# Patient Record
Sex: FEMALE | NOT HISPANIC OR LATINO | Employment: UNEMPLOYED | URBAN - METROPOLITAN AREA
[De-identification: names, ages, dates, MRNs, and addresses within clinical notes are randomized per-mention and may not be internally consistent; named-entity substitution may affect disease eponyms.]

---

## 2017-01-13 ENCOUNTER — TELEPHONE (OUTPATIENT)
Dept: OBSTETRICS AND GYNECOLOGY | Facility: CLINIC | Age: 42
End: 2017-01-13

## 2017-01-13 NOTE — TELEPHONE ENCOUNTER
----- Message from Samanta Hatfield sent at 1/13/2017 10:35 AM CST -----  Contact: self  Pt needing a call back, pt states she has called before, pt can be reached at [phone].

## 2017-01-13 NOTE — TELEPHONE ENCOUNTER
I spoke to the pt and she states that she has been bleeding. The pt  missed two pills and then she caught up with one and did not take the second one she missed. The pt states that she started back taking her pills daily and now she has been bleeding a lot. Pt was informed to continue her pills and she should stop Bleeding.. Pt was informed to keep her appt on next week to discuss other birth control options.

## 2017-02-15 ENCOUNTER — OFFICE VISIT (OUTPATIENT)
Dept: OBSTETRICS AND GYNECOLOGY | Facility: CLINIC | Age: 42
End: 2017-02-15
Payer: MEDICAID

## 2017-02-15 ENCOUNTER — PATIENT OUTREACH (OUTPATIENT)
Dept: ADMINISTRATIVE | Facility: HOSPITAL | Age: 42
End: 2017-02-15

## 2017-02-15 VITALS
SYSTOLIC BLOOD PRESSURE: 120 MMHG | DIASTOLIC BLOOD PRESSURE: 60 MMHG | HEIGHT: 60 IN | BODY MASS INDEX: 28.39 KG/M2 | WEIGHT: 144.63 LBS

## 2017-02-15 DIAGNOSIS — Z30.41 ENCOUNTER FOR SURVEILLANCE OF CONTRACEPTIVE PILLS: ICD-10-CM

## 2017-02-15 DIAGNOSIS — Z12.31 VISIT FOR SCREENING MAMMOGRAM: ICD-10-CM

## 2017-02-15 DIAGNOSIS — N92.1 BREAKTHROUGH BLEEDING ON OCPS: ICD-10-CM

## 2017-02-15 DIAGNOSIS — Z01.419 VISIT FOR GYNECOLOGIC EXAMINATION: Primary | ICD-10-CM

## 2017-02-15 LAB
B-HCG UR QL: NEGATIVE
CTP QC/QA: YES

## 2017-02-15 PROCEDURE — 87591 N.GONORRHOEAE DNA AMP PROB: CPT

## 2017-02-15 PROCEDURE — 99396 PREV VISIT EST AGE 40-64: CPT | Mod: S$PBB,,, | Performed by: NURSE PRACTITIONER

## 2017-02-15 PROCEDURE — 99999 PR PBB SHADOW E&M-EST. PATIENT-LVL III: CPT | Mod: PBBFAC,,, | Performed by: NURSE PRACTITIONER

## 2017-02-15 PROCEDURE — 81025 URINE PREGNANCY TEST: CPT | Mod: PBBFAC | Performed by: NURSE PRACTITIONER

## 2017-02-15 PROCEDURE — 99213 OFFICE O/P EST LOW 20 MIN: CPT | Mod: PBBFAC | Performed by: NURSE PRACTITIONER

## 2017-02-15 RX ORDER — DROSPIRENONE AND ETHINYL ESTRADIOL 0.02-3(28)
1 KIT ORAL DAILY
Qty: 30 TABLET | Refills: 11 | Status: SHIPPED | OUTPATIENT
Start: 2017-02-15 | End: 2018-01-29 | Stop reason: SDUPTHER

## 2017-02-15 RX ORDER — DEXTROAMPHETAMINE SACCHARATE, AMPHETAMINE ASPARTATE, DEXTROAMPHETAMINE SULFATE AND AMPHETAMINE SULFATE 2.5; 2.5; 2.5; 2.5 MG/1; MG/1; MG/1; MG/1
TABLET ORAL
Refills: 0 | COMMUNITY
Start: 2017-02-11

## 2017-02-15 NOTE — PROGRESS NOTES
CC: Annual  HPI: Pt is a 42 y.o.  female who presents for routine annual exam. She uses OCPs for contraception. Pt has hx of fibroids and endometriosis. Pt reports breakthrough bleeding on OCPs-denies missing any pills in the pack. Pt is requesting a different OCP. States she used to be on one OCP that never gave her problems but it is now discontinued. Pt is debating a tummy tuck and a hysterectomy together if the bleeding continues. Pt has appt with plastic surgeon at [facility]. Reports fibrocystic breasts. Denies family hx of breast cancer.     Last MMG 4/2015  Last pap 1/2015 WNL    ROS:  GENERAL: Feeling well overall. Denies fever or chills.   SKIN: Denies rash or lesions.   HEAD: Denies head injury or headache.   NODES: Denies enlarged lymph nodes.   CHEST: Denies chest pain or shortness of breath.   CARDIOVASCULAR: Denies palpitations or left sided chest pain.   ABDOMEN: No abdominal pain, constipation, diarrhea, nausea, vomiting or rectal bleeding.   URINARY: No dysuria, hematuria, or burning on urination.  REPRODUCTIVE: See HPI.   BREASTS: Denies pain, lumps, or nipple discharge.   HEMATOLOGIC: No easy bruisability or excessive bleeding.   MUSCULOSKELETAL: Denies joint pain or swelling.   NEUROLOGIC: Denies syncope or weakness.   PSYCHIATRIC: Denies depression, anxiety or mood swings.    PE:   APPEARANCE: Well nourished, well developed, Black or  female in no acute distress.  NODES: no cervical, supraclavicular, or inguinal lymphadenopathy  BREASTS: Symmetrical, no skin changes or visible lesions. No palpable masses, nipple discharge or adenopathy bilaterally.  ABDOMEN: Soft. No tenderness or masses. No distention. No hernias palpated. No CVA tenderness.  VULVA: No lesions. Normal external female genitalia.  URETHRAL MEATUS: Normal size and location, no lesions, no prolapse.  URETHRA: No masses, tenderness, or prolapse.  VAGINA: Moist. No lesions or lacerations noted. No abnormal discharge  present. No odor present.   CERVIX: No lesions or discharge. No cervical motion tenderness.   UTERUS: Normal size, regular shape, mobile, non-tender.  ADNEXA: No tenderness. No fullness or masses palpated in the adnexal regions.   ANUS PERINEUM: Normal.      Diagnosis:  1. Visit for gynecologic examination    2. Visit for screening mammogram    3. Breakthrough bleeding on OCPs        Plan:     Orders Placed This Encounter    C. trachomatis/N. gonorrhoeae by AMP DNA Urine    Mammo Digital Screening Bilat with CAD    POCT urine pregnancy     UPT negative  GCCT pending  Rx new OCP-discussed giving it 3-4 months to regulate  Pap current-due in 2018  MMG scheduled  Pt will make appt with Dr. Deleon if she wants to proceed with hyst.     Patient was counseled today on the new ACS guidelines for cervical cytology screening as well as the current recommendations for breast cancer screening. She was counseled to follow up with her PCP for other routine health maintenance. Counseling session lasted approximately 10 minutes, and all her questions were answered.    Follow-up with me in 1 year for routine exam; pap in 1 year.

## 2017-02-15 NOTE — MR AVS SNAPSHOT
[facility] [address]  [address]  [address]  [facility] [address]  Phone: [phone]  Fax: [phone]                  Matilda Melgar   2/15/2017 3:20 PM   Office Visit    Description:  Female : 1975   Provider:  Margarita Gomez NP   Department:  [facility] [address]           Reason for Visit     Well Woman                To Do List           Future Appointments        Provider Department Dept Phone    2/15/2017 3:20 PM Margarita Gomez NP [facility] [address] [phone]      Goals (5 Years of Data)     None      [facility] On Call     [facility] On Call Nurse [facility] Line -  Assistance  Registered nurses in the [facility] On Call Center provide clinical advisement, health education, appointment booking, and other advisory services.  Call for this free service at [phone].             Medications           Message regarding Medications     Verify the changes and/or additions to your medication regime listed below are the same as discussed with your clinician today.  If any of these changes or additions are incorrect, please notify your healthcare provider.        STOP taking these medications     norgestimate-ethinyl estradiol (ORTHO TRI-CYCLEN,TRI-SPRINTEC) 0.18/0.215/0.25 mg-35 mcg (28) tablet Take 1 tablet by mouth once daily.    pantoprazole (PROTONIX) 40 MG tablet     sertraline (ZOLOFT) 50 MG tablet Take 1 tablet (50 mg total) by mouth once daily.           Verify that the below list of medications is an accurate representation of the medications you are currently taking.  If none reported, the list may be blank. If incorrect, please contact your healthcare provider. Carry this list with you in case of emergency.           Current Medications     ranitidine (ZANTAC) 150 MG tablet Take 150 mg by mouth 2 (two) times daily.    alprazolam (XANAX) 0.25 MG tablet     biotin 5 mg Tab Take by mouth.    dextroamphetamine-amphetamine 10 mg Tab TK ONE T PO  QAM AND  1 T PO AT NOON FOR ADHD    ferrous sulfate 324 mg (65 mg iron) TbEC Take 325 mg by mouth once daily.    fish oil-omega-3 fatty acids 300-1,000 mg capsule Take 2 g by mouth once daily.    folic acid (FOLVITE) 400 MCG tablet Take 400 mcg by mouth once daily.    lisinopril-hydrochlorothiazide (PRINZIDE,ZESTORETIC) 20-12.5 mg per tablet     multivitamin capsule Take 1 capsule by mouth once daily.    norethindrone-e.estradiol-iron 1 mg-20 mcg (24)/75 mg (4) Oral per tablet Take 1 tablet by mouth once daily.           Clinical Reference Information           Your Vitals Were     BP Height Weight BMI       120/60 5' (1.524 m) 65.6 kg (144 lb 10 oz) 28.24 kg/m2       Blood Pressure          Most Recent Value    BP  120/60      Allergies as of 2/15/2017     No Known Allergies      Immunizations Administered on Date of Encounter - 2/15/2017     None      Language Assistance Services     ATTENTION: Language assistance services are available, free of charge. Please call [phone].      ATENCIÓN: Si habla español, tiene a ovalle disposición servicios gratuitos de asistencia lingüística. Llame al [phone].     SHON Ý: N?u b?n nói Ti?ng Vi?t, có các d?ch v? h? tr? ngôn ng? mi?n phí dành cho b?n. G?i s? [phone].         [facility] [address] complies with applicable Federal civil rights laws and does not discriminate on the basis of race, color, national origin, age, disability, or sex.

## 2017-02-17 LAB
C TRACH DNA SPEC QL NAA+PROBE: NEGATIVE
N GONORRHOEA DNA SPEC QL NAA+PROBE: NEGATIVE

## 2017-03-01 ENCOUNTER — HOSPITAL ENCOUNTER (OUTPATIENT)
Dept: RADIOLOGY | Facility: OTHER | Age: 42
Discharge: HOME | End: 2017-03-01
Attending: NURSE PRACTITIONER
Payer: MEDICAID

## 2017-03-01 DIAGNOSIS — Z12.31 VISIT FOR SCREENING MAMMOGRAM: ICD-10-CM

## 2017-03-01 PROCEDURE — 77063 BREAST TOMOSYNTHESIS BI: CPT | Mod: 26,,, | Performed by: INTERNAL MEDICINE

## 2017-03-01 PROCEDURE — 77067 SCR MAMMO BI INCL CAD: CPT | Mod: 26,,, | Performed by: INTERNAL MEDICINE

## 2017-03-01 PROCEDURE — 77067 SCR MAMMO BI INCL CAD: CPT | Mod: TC

## 2017-03-15 ENCOUNTER — TELEPHONE (OUTPATIENT)
Dept: OBSTETRICS AND GYNECOLOGY | Facility: CLINIC | Age: 42
End: 2017-03-15

## 2017-03-15 NOTE — TELEPHONE ENCOUNTER
Pt called and stated that after switching her BC within 2 months she is still having bleeding. Pt informed that it can be up to 3-6 months before her cycle can regulate due to her switching pills within 2 months. Pt informed that a message will be sent to Margarita to see if she recommends anything else that she should do.

## 2017-03-15 NOTE — TELEPHONE ENCOUNTER
----- Message from Faiza Regan sent at 3/15/2017  1:26 PM CDT -----  Contact: Self  Pt is calling in regards of her bc MURTAZA. The pt can be reached at [phone]. Thanks KG

## 2017-03-15 NOTE — TELEPHONE ENCOUNTER
Pt informed to continue BC pills and not to skip any and that she can have breakthrough bleeding while on the pills. Informed that after 4 months if this continues to make an appt to discuss other BC options. Pt also stated that at the end of April she will stop the bc pills due to her having cosmetic surgery and surgeon wants her to be off the pills for a month prior to the surgery. Informed message will be sent to Margarita.

## 2017-03-16 NOTE — TELEPHONE ENCOUNTER
LM on VM informing pt that it was okay to stop her OCPs if her surgeon is telling her to do so. If any other questions, left message with CB number.

## 2017-04-03 ENCOUNTER — TELEPHONE (OUTPATIENT)
Dept: OBSTETRICS AND GYNECOLOGY | Facility: CLINIC | Age: 42
End: 2017-04-03

## 2017-04-03 NOTE — TELEPHONE ENCOUNTER
----- Message from Ilan Cunningham sent at 4/3/2017  1:59 PM CDT -----  Contact: Pt  X_ 1st Request  _ 2nd Request  _ 3rd Request    Who:BUSTER GUTIERREZ [[id number]]    Why: Patient states she is returning a call    What Number to Call Back: [phone]    When to Expect a call back: (Before the end of the day)  -- if call after 3:00 call back will be tomorrow.

## 2017-04-03 NOTE — TELEPHONE ENCOUNTER
----- Message from Faiza Regan sent at 4/3/2017  9:03 AM CDT -----  Contact: Self  Pt is calling in regards of a RX that was suppose to be sent to her pharmacy. The pt states that she is not sure what kind. The pt can be reached at [phone]. Thanks KG

## 2017-04-03 NOTE — TELEPHONE ENCOUNTER
Pt is still bleeding since December and she uses 1 pad a day. Pt scheduled her an appt on the my chart and no one was in the office to see her. Pt states that she is on birth control pills but she is still having the bleeding and wanted to know what else she can take to help with the bleeding. The pt states that she lives in [place] and cannot keep driving down for an appt to have nothing done. Pt states that her  is leaving out of town on Friday and she has not been able to be with her  since before December.

## 2017-04-03 NOTE — TELEPHONE ENCOUNTER
----- Message from Barbara Mckee sent at 4/3/2017  1:56 PM CDT -----  Contact: BUSTER GUTIERREZ [[id number]]  _  1st Request  _  2nd Request  _  3rd Request        Who: BUSTER GUTIERREZ [[id number]]    Why: Patient is returning a call    What Number to Call Back: [phone]    When to Expect a call back: (Before the end of the day)   -- if call after 3:00 call back will be tomorrow.

## 2017-04-03 NOTE — TELEPHONE ENCOUNTER
I left a message for the pt to call the office back to inform our office what  Medicine she is calling about that she needs to have called in

## 2017-04-06 ENCOUNTER — OFFICE VISIT (OUTPATIENT)
Dept: OBSTETRICS AND GYNECOLOGY | Facility: CLINIC | Age: 42
End: 2017-04-06
Payer: MEDICAID

## 2017-04-06 VITALS
HEIGHT: 60 IN | SYSTOLIC BLOOD PRESSURE: 110 MMHG | WEIGHT: 140 LBS | BODY MASS INDEX: 27.48 KG/M2 | DIASTOLIC BLOOD PRESSURE: 60 MMHG

## 2017-04-06 DIAGNOSIS — I10 BENIGN ESSENTIAL HTN: ICD-10-CM

## 2017-04-06 DIAGNOSIS — N92.1 MENORRHAGIA WITH IRREGULAR CYCLE: Primary | ICD-10-CM

## 2017-04-06 DIAGNOSIS — D25.9 UTERINE LEIOMYOMA, UNSPECIFIED LOCATION: ICD-10-CM

## 2017-04-06 LAB
B-HCG UR QL: NEGATIVE
CTP QC/QA: YES

## 2017-04-06 PROCEDURE — 99212 OFFICE O/P EST SF 10 MIN: CPT | Mod: S$PBB,,, | Performed by: OBSTETRICS & GYNECOLOGY

## 2017-04-06 PROCEDURE — 81025 URINE PREGNANCY TEST: CPT | Mod: PBBFAC | Performed by: OBSTETRICS & GYNECOLOGY

## 2017-04-06 PROCEDURE — 99999 PR PBB SHADOW E&M-EST. PATIENT-LVL III: CPT | Mod: PBBFAC,,, | Performed by: OBSTETRICS & GYNECOLOGY

## 2017-04-06 PROCEDURE — 99213 OFFICE O/P EST LOW 20 MIN: CPT | Mod: PBBFAC | Performed by: OBSTETRICS & GYNECOLOGY

## 2017-04-06 RX ORDER — ALPRAZOLAM 0.5 MG/1
TABLET ORAL
Refills: 1 | COMMUNITY
Start: 2017-03-10 | End: 2018-08-23

## 2017-04-06 RX ORDER — LISINOPRIL AND HYDROCHLOROTHIAZIDE 10; 12.5 MG/1; MG/1
TABLET ORAL
Refills: 1 | COMMUNITY
Start: 2017-03-12

## 2017-04-06 RX ORDER — PANTOPRAZOLE SODIUM 40 MG/1
TABLET, DELAYED RELEASE ORAL
Refills: 3 | COMMUNITY
Start: 2017-03-18

## 2017-04-06 RX ORDER — SUMATRIPTAN 50 MG/1
TABLET, FILM COATED ORAL
Refills: 2 | COMMUNITY
Start: 2017-02-15

## 2017-04-06 RX ORDER — DIAZEPAM 5 MG/1
TABLET ORAL
Refills: 2 | COMMUNITY
Start: 2017-03-25

## 2017-04-10 NOTE — PROGRESS NOTES
HISTORY OF PRESENT ILLNESS:    Matilda Melgar is a 42 y.o. female  Patient's last menstrual period was 2017. presents today complaining of heavy irregular menses & BTB on new pills.    Past Medical History:   Diagnosis Date    Anxiety 2015    Endometriosis     Fibroid, uterine     History of Helicobacter pylori infection 2015    CALLIE (iron deficiency anemia) 2015       Past Surgical History:   Procedure Laterality Date    ENDOMETRIAL ABLATION      MOUTH SURGERY      PELVIC LAPAROSCOPY      Dr. Loo    RIGHT OOPHORECTOMY      [place]- Dr Loo    TUBAL LIGATION         MEDICATIONS AND ALLERGIES:      Current Outpatient Prescriptions:     alprazolam (XANAX) 0.25 MG tablet, , Disp: , Rfl: 0    biotin 5 mg Tab, Take by mouth., Disp: , Rfl:     dextroamphetamine-amphetamine 10 mg Tab, TK ONE T PO  QAM AND 1 T PO AT NOON FOR ADHD, Disp: , Rfl: 0    diazePAM (VALIUM) 5 MG tablet, TK 1 T PO 30 MINUTES PRIOR TO PROCEDURE FOR ANXIETY. DO NOT TAKE MORE THAN 2 T IN 24 HOURS, Disp: , Rfl: 2    drospirenone-ethinyl estradiol (MURTAZA) 3-0.02 mg per tablet, Take 1 tablet by mouth once daily., Disp: 30 tablet, Rfl: 11    ferrous sulfate 324 mg (65 mg iron) TbEC, Take 325 mg by mouth once daily., Disp: , Rfl:     fish oil-omega-3 fatty acids 300-1,000 mg capsule, Take 2 g by mouth once daily., Disp: , Rfl:     folic acid (FOLVITE) 400 MCG tablet, Take 400 mcg by mouth once daily., Disp: , Rfl:     lisinopril-hydrochlorothiazide (PRINZIDE,ZESTORETIC) 10-12.5 mg per tablet, TK 1 T PO  QAM FOR HIGH BLOOD PRESSURE, Disp: , Rfl: 1    multivitamin capsule, Take 1 capsule by mouth once daily., Disp: , Rfl:     pantoprazole (PROTONIX) 40 MG tablet, TK 1 T PO  QD PRF ACID REFLUX, Disp: , Rfl: 3    ranitidine (ZANTAC) 150 MG tablet, Take 150 mg by mouth 2 (two) times daily., Disp: , Rfl:     sumatriptan (IMITREX) 50 MG tablet, TK 1 T PO AT ONSET OF HEADACHE. MAY TAKE ANOTHER  IN 2 H IF SYMPTOMS PERSIST. DO NOT TK MORE THAN 2 T IN A DAY, Disp: , Rfl: 2    alprazolam (XANAX) 0.5 MG tablet, , Disp: , Rfl: 1    lisinopril-hydrochlorothiazide (PRINZIDE,ZESTORETIC) 20-12.5 mg per tablet, , Disp: , Rfl: 3    Review of patient's allergies indicates:  No Known Allergies    COMPREHENSIVE GYN HISTORY:  PAP History: Denies abnormal Paps.  Infection History: Denies STDs. Denies PID.  Benign History: Denies uterine fibroids. Denies ovarian cysts. Denies endometriosis. Denies other conditions.  Cancer History: Denies cervical cancer. Denies uterine cancer or hyperplasia. Denies ovarian cancer. Denies vulvar cancer or pre-cancer. Denies vaginal cancer or pre-cancer. Denies breast cancer. Denies colon cancer.  Sexual Activity History: Reports currently being sexually active  Menstrual History: Every 28 days, flows for 4 days. Light flow.  Dysmenorrhea History: Denies dysmenorrhea.  Contraception History:      ROS:  GENERAL: No fever or chills.  BREASTS: No pain. No lumps. No discharge.  ABDOMEN: No pain. No nausea. No vomiting. No diarrhea. No constipation.  REPRODUCTIVE: No abnormal bleeding.   VULVA: No pain. No lesions. No itching.  VAGINA: No relaxation. No itching. No odor. No discharge. No lesions.  URINARY: No incontinence. No nocturia. No frequency. No dysuria.    PE:  APPEARANCE: Well nourished, well developed, in no acute distress.  AFFECT: WNL, alert and oriented x 3.  ABDOMEN: Soft. No tenderness or masses. No hepatosplenomegaly. No hernias.  Deferred      1. Menorrhagia with irregular cycle    2. Uterine leiomyoma, unspecified location    3. Benign essential HTN        PLAN:    Orders Placed This Encounter    US Pelvis Comp with Transvag NON-OB (xpd    POCT Urine Pregnancy       COUNSELING:  The patient was counseled today on:    FOLLOW-UP with me in 2-3 weeks

## 2017-10-17 ENCOUNTER — PATIENT OUTREACH (OUTPATIENT)
Dept: INTERNAL MEDICINE | Facility: CLINIC | Age: 42
End: 2017-10-17

## 2017-10-17 NOTE — PROGRESS NOTES
Patient was contacted to scheduled visit with Jonny Tello MD. Attempt unsuccessful,will follow up with patient at a later time.   Voice mail message left.

## 2018-01-29 ENCOUNTER — OFFICE VISIT (OUTPATIENT)
Dept: OBSTETRICS AND GYNECOLOGY | Facility: CLINIC | Age: 43
End: 2018-01-29
Payer: MEDICAID

## 2018-01-29 VITALS
DIASTOLIC BLOOD PRESSURE: 76 MMHG | HEIGHT: 60 IN | SYSTOLIC BLOOD PRESSURE: 124 MMHG | WEIGHT: 141.56 LBS | BODY MASS INDEX: 27.79 KG/M2

## 2018-01-29 DIAGNOSIS — Z12.31 VISIT FOR SCREENING MAMMOGRAM: Primary | ICD-10-CM

## 2018-01-29 DIAGNOSIS — F41.9 ANXIETY: ICD-10-CM

## 2018-01-29 DIAGNOSIS — Z01.419 ENCOUNTER FOR GYNECOLOGICAL EXAMINATION WITHOUT ABNORMAL FINDING: ICD-10-CM

## 2018-01-29 PROCEDURE — 3008F BODY MASS INDEX DOCD: CPT | Mod: ,,, | Performed by: OBSTETRICS & GYNECOLOGY

## 2018-01-29 PROCEDURE — 99999 PR PBB SHADOW E&M-EST. PATIENT-LVL III: CPT | Mod: PBBFAC,,, | Performed by: OBSTETRICS & GYNECOLOGY

## 2018-01-29 PROCEDURE — 99213 OFFICE O/P EST LOW 20 MIN: CPT | Mod: S$PBB,,, | Performed by: OBSTETRICS & GYNECOLOGY

## 2018-01-29 PROCEDURE — 99213 OFFICE O/P EST LOW 20 MIN: CPT | Mod: PBBFAC | Performed by: OBSTETRICS & GYNECOLOGY

## 2018-01-29 RX ORDER — ADAPALENE 0.1 %
GEL (GRAM) TOPICAL
Refills: 1 | COMMUNITY
Start: 2018-01-10

## 2018-02-05 NOTE — PROGRESS NOTES
HISTORY OF PRESENT ILLNESS:    Matilda Melgar is a 43 y.o. female  Patient's last menstrual period was 2017. presents today for follow up of menorrhagia which is resolving.     Past Medical History:   Diagnosis Date    Anxiety 2015    Endometriosis     Fibroid, uterine     History of Helicobacter pylori infection 2015    CALLIE (iron deficiency anemia) 2015       Past Surgical History:   Procedure Laterality Date    ENDOMETRIAL ABLATION      MOUTH SURGERY      PELVIC LAPAROSCOPY      Dr. Loo    RIGHT OOPHORECTOMY      [place]- Dr Loo    TUBAL LIGATION         MEDICATIONS AND ALLERGIES:      Current Outpatient Prescriptions:     alprazolam (XANAX) 0.25 MG tablet, , Disp: , Rfl: 0    alprazolam (XANAX) 0.5 MG tablet, , Disp: , Rfl: 1    biotin 5 mg Tab, Take by mouth., Disp: , Rfl:     dextroamphetamine-amphetamine 10 mg Tab, TK ONE T PO  QAM AND 1 T PO AT NOON FOR ADHD, Disp: , Rfl: 0    diazePAM (VALIUM) 5 MG tablet, TK 1 T PO 30 MINUTES PRIOR TO PROCEDURE FOR ANXIETY. DO NOT TAKE MORE THAN 2 T IN 24 HOURS, Disp: , Rfl: 2    DIFFERIN 0.1 % gel, APPLY THINLY TO FACE QOD, Disp: , Rfl: 1    ferrous sulfate 324 mg (65 mg iron) TbEC, Take 325 mg by mouth once daily., Disp: , Rfl:     fish oil-omega-3 fatty acids 300-1,000 mg capsule, Take 2 g by mouth once daily., Disp: , Rfl:     folic acid (FOLVITE) 400 MCG tablet, Take 400 mcg by mouth once daily., Disp: , Rfl:     lisinopril-hydrochlorothiazide (PRINZIDE,ZESTORETIC) 10-12.5 mg per tablet, TK 1 T PO  QAM FOR HIGH BLOOD PRESSURE, Disp: , Rfl: 1    multivitamin capsule, Take 1 capsule by mouth once daily., Disp: , Rfl:     pantoprazole (PROTONIX) 40 MG tablet, TK 1 T PO  QD PRF ACID REFLUX, Disp: , Rfl: 3    ranitidine (ZANTAC) 150 MG tablet, Take 150 mg by mouth 2 (two) times daily., Disp: , Rfl:     sumatriptan (IMITREX) 50 MG tablet, TK 1 T PO AT ONSET OF HEADACHE. MAY TAKE ANOTHER IN 2 H IF  SYMPTOMS PERSIST. DO NOT TK MORE THAN 2 T IN A DAY, Disp: , Rfl: 2    Review of patient's allergies indicates:  No Known Allergies    COMPREHENSIVE GYN HISTORY:  PAP History: Denies abnormal Paps.  Infection History: Denies STDs. Denies PID.  Benign History: Denies uterine fibroids. Denies ovarian cysts. Denies endometriosis. Denies other conditions.  Cancer History: Denies cervical cancer. Denies uterine cancer or hyperplasia. Denies ovarian cancer. Denies vulvar cancer or pre-cancer. Denies vaginal cancer or pre-cancer. Denies breast cancer. Denies colon cancer.  Sexual Activity History: Reports currently being sexually active  Menstrual History: Every 28 days, flows for 4 days. Light flow.  Dysmenorrhea History: Denies dysmenorrhea.    ROS:  GENERAL: No fever or chills.  BREASTS: No pain. No lumps. No discharge.  ABDOMEN: No pain. No nausea. No vomiting. No diarrhea. No constipation.  REPRODUCTIVE: No abnormal bleeding.   VULVA: No pain. No lesions. No itching.  VAGINA: No relaxation. No itching. No odor. No discharge. No lesions.  URINARY: No incontinence. No nocturia. No frequency. No dysuria.    PE:  APPEARANCE: Well nourished, well developed, in no acute distress.  AFFECT: WNL, alert and oriented x 3.  Deferred      1. Visit for screening mammogram    2. Anxiety    3. Encounter for gynecological examination without abnormal finding        PLAN:    Orders Placed This Encounter    Mammo Digital Screening Bilat With CAD       FOLLOW-UP with me for annual exam   Menstrual diary

## 2018-05-22 ENCOUNTER — TELEPHONE (OUTPATIENT)
Dept: OBSTETRICS AND GYNECOLOGY | Facility: CLINIC | Age: 43
End: 2018-05-22

## 2018-05-22 NOTE — TELEPHONE ENCOUNTER
----- Message from Antoine Soto sent at 5/22/2018 10:33 AM CDT -----  Contact: Pt  Pt is requesting an annual wellness exam.     Pt would like to schedule appt for Monday 5/28 preferably before or after scheduled mammogram appt.    Pt can be reached at [phone].    Thank you!

## 2018-05-28 ENCOUNTER — HOSPITAL ENCOUNTER (OUTPATIENT)
Dept: RADIOLOGY | Facility: OTHER | Age: 43
Discharge: HOME | End: 2018-05-28
Attending: OBSTETRICS & GYNECOLOGY
Payer: MEDICAID

## 2018-05-28 DIAGNOSIS — Z12.31 VISIT FOR SCREENING MAMMOGRAM: ICD-10-CM

## 2018-05-28 PROCEDURE — 77063 BREAST TOMOSYNTHESIS BI: CPT | Mod: 26,,, | Performed by: RADIOLOGY

## 2018-05-28 PROCEDURE — 77067 SCR MAMMO BI INCL CAD: CPT | Mod: 26,,, | Performed by: RADIOLOGY

## 2018-05-28 PROCEDURE — 77067 SCR MAMMO BI INCL CAD: CPT | Mod: TC

## 2018-08-23 ENCOUNTER — OFFICE VISIT (OUTPATIENT)
Dept: OBSTETRICS AND GYNECOLOGY | Facility: CLINIC | Age: 43
End: 2018-08-23
Payer: MEDICAID

## 2018-08-23 VITALS
DIASTOLIC BLOOD PRESSURE: 80 MMHG | HEIGHT: 60 IN | BODY MASS INDEX: 25.62 KG/M2 | SYSTOLIC BLOOD PRESSURE: 114 MMHG | WEIGHT: 130.5 LBS

## 2018-08-23 DIAGNOSIS — Z12.31 VISIT FOR SCREENING MAMMOGRAM: ICD-10-CM

## 2018-08-23 DIAGNOSIS — I10 BENIGN ESSENTIAL HTN: Primary | ICD-10-CM

## 2018-08-23 DIAGNOSIS — Z01.419 ENCOUNTER FOR GYNECOLOGICAL EXAMINATION WITHOUT ABNORMAL FINDING: ICD-10-CM

## 2018-08-23 PROCEDURE — 99396 PREV VISIT EST AGE 40-64: CPT | Mod: S$PBB,,, | Performed by: OBSTETRICS & GYNECOLOGY

## 2018-08-23 PROCEDURE — 88175 CYTOPATH C/V AUTO FLUID REDO: CPT

## 2018-08-23 PROCEDURE — 99213 OFFICE O/P EST LOW 20 MIN: CPT | Mod: PBBFAC | Performed by: OBSTETRICS & GYNECOLOGY

## 2018-08-23 PROCEDURE — 99999 PR PBB SHADOW E&M-EST. PATIENT-LVL III: CPT | Mod: PBBFAC,,, | Performed by: OBSTETRICS & GYNECOLOGY

## 2018-08-23 NOTE — PROGRESS NOTES
HISTORY OF PRESENT ILLNESS:    Matilda Melgar is a 43 y.o. female, , Patient's last menstrual period was 2018.,  presents for a routine exam and has no complaints.    Past Medical History:   Diagnosis Date    Anxiety 2015    Endometriosis     Fibroid, uterine     History of Helicobacter pylori infection 2015    CALLIE (iron deficiency anemia) 2015       Past Surgical History:   Procedure Laterality Date    ENDOMETRIAL ABLATION      MOUTH SURGERY      OOPHORECTOMY Right 2011    PELVIC LAPAROSCOPY      Dr. Loo    RIGHT OOPHORECTOMY      [place]- Dr Loo    TUBAL LIGATION         MEDICATIONS AND ALLERGIES:      Current Outpatient Medications:     alprazolam (XANAX) 0.25 MG tablet, , Disp: , Rfl: 0    biotin 5 mg Tab, Take by mouth., Disp: , Rfl:     dextroamphetamine-amphetamine 10 mg Tab, TK ONE T PO  QAM AND 1 T PO AT NOON FOR ADHD, Disp: , Rfl: 0    diazePAM (VALIUM) 5 MG tablet, TK 1 T PO 30 MINUTES PRIOR TO PROCEDURE FOR ANXIETY. DO NOT TAKE MORE THAN 2 T IN 24 HOURS, Disp: , Rfl: 2    DIFFERIN 0.1 % gel, APPLY THINLY TO FACE QOD, Disp: , Rfl: 1    ferrous sulfate 324 mg (65 mg iron) TbEC, Take 325 mg by mouth once daily., Disp: , Rfl:     fish oil-omega-3 fatty acids 300-1,000 mg capsule, Take 2 g by mouth once daily., Disp: , Rfl:     folic acid (FOLVITE) 400 MCG tablet, Take 400 mcg by mouth once daily., Disp: , Rfl:     lisinopril-hydrochlorothiazide (PRINZIDE,ZESTORETIC) 10-12.5 mg per tablet, TK 1 T PO  QAM FOR HIGH BLOOD PRESSURE, Disp: , Rfl: 1    multivitamin capsule, Take 1 capsule by mouth once daily., Disp: , Rfl:     pantoprazole (PROTONIX) 40 MG tablet, TK 1 T PO  QD PRF ACID REFLUX, Disp: , Rfl: 3    ranitidine (ZANTAC) 150 MG tablet, Take 150 mg by mouth 2 (two) times daily., Disp: , Rfl:     sumatriptan (IMITREX) 50 MG tablet, TK 1 T PO AT ONSET OF HEADACHE. MAY TAKE ANOTHER IN 2 H IF SYMPTOMS PERSIST. DO NOT TK MORE THAN 2 T  IN A DAY, Disp: , Rfl: 2    Review of patient's allergies indicates:  No Known Allergies    Family History   Adopted: Yes   Problem Relation Age of Onset    Breast cancer Neg Hx     Colon cancer Neg Hx     Ovarian cancer Neg Hx        Social History     Socioeconomic History    Marital status: Single     Spouse name: Not on file    Number of children: Not on file    Years of education: Not on file    Highest education level: Not on file   Social Needs    Financial resource strain: Not on file    Food insecurity - worry: Not on file    Food insecurity - inability: Not on file    Transportation needs - medical: Not on file    Transportation needs - non-medical: Not on file   Occupational History    Not on file   Tobacco Use    Smoking status: Never Smoker    Smokeless tobacco: Never Used   Substance and Sexual Activity    Alcohol use: No    Drug use: Not on file    Sexual activity: Yes     Partners: Male   Other Topics Concern    Not on file   Social History Narrative    Not on file       COMPREHENSIVE GYN HISTORY:  PAP History: Denies abnormal Paps.  Infection History: Denies STDs. Denies PID.  Benign History: Denies uterine fibroids. Denies ovarian cysts. Denies endometriosis. Denies other conditions.  Cancer History: Denies cervical cancer. Denies uterine cancer or hyperplasia. Denies ovarian cancer. Denies vulvar cancer or pre-cancer. Denies vaginal cancer or pre-cancer. Denies breast cancer. Denies colon cancer.  Sexual Activity History: Reports currently being sexually active  Menstrual History: Monthly. Mod then light flow.   Dysmenorrhea History: Reports mild dysmenorrhea.       ROS:  GENERAL: No weight changes. No swelling. No fatigue. No fever.  CARDIOVASCULAR: No chest pain. No shortness of breath. No leg cramps.   NEUROLOGICAL: No headaches. No vision changes.  BREASTS: No pain. No lumps. No discharge.  ABDOMEN: No pain. No nausea. No vomiting. No diarrhea. No  constipation.  REPRODUCTIVE: No abnormal bleeding.   VULVA: No pain. No lesions. No itching.  VAGINA: No relaxation. No itching. No odor. No discharge. No lesions.  URINARY: No incontinence. No nocturia. No frequency. No dysuria.    /80   Ht 5' (1.524 m)   Wt 59.2 kg (130 lb 8.2 oz)   LMP 04/23/2018   BMI 25.49 kg/m²     PE:  APPEARANCE: Well nourished, well developed, in no acute distress.  AFFECT: WNL, alert and oriented x 3.  SKIN: No acne or hirsutism.  NECK: Neck symmetric, without masses or thyromegaly.  NODES: No inguinal, cervical, axillary or femoral lymph node enlargement.  CHEST: Good respiratory effort.   ABDOMEN: Soft. No tenderness or masses. No hepatosplenomegaly. No hernias.  BREASTS: Symmetrical, no skin changes, visible lesions, palpable masses or nipple discharge bilaterally.  PELVIC: External female genitalia without lesions.  Female hair distribution. Adequate perineal body, Normal urethral meatus. Vagina moist and well rugated without lesions or discharge.  No significant cystocele or rectocele present. Cervix pink without lesions, discharge or tenderness. Uterus is 12 week size, regular, mobile and nontender. Adnexa without masses or tenderness.  EXTREMITIES: No edema    DIAGNOSIS:  1. Benign essential HTN    2. Encounter for gynecological examination without abnormal finding    3. Visit for screening mammogram        PLAN:    Orders Placed This Encounter    Liquid-based pap smear, screening       COUNSELING:  The patient was counseled today on:  -A.C.S. Pap and pelvic exam guidelines (pap every 3 years), recomendations for yearly mammogram;  -to follow up with her PCP for other health maintenance.    FOLLOW-UP with me annually.

## 2018-10-29 ENCOUNTER — OFFICE VISIT (OUTPATIENT)
Dept: URGENT CARE | Facility: CLINIC | Age: 43
End: 2018-10-29
Payer: MEDICAID

## 2018-10-29 VITALS
SYSTOLIC BLOOD PRESSURE: 128 MMHG | OXYGEN SATURATION: 98 % | DIASTOLIC BLOOD PRESSURE: 80 MMHG | WEIGHT: 130 LBS | HEIGHT: 60 IN | TEMPERATURE: 99 F | HEART RATE: 98 BPM | BODY MASS INDEX: 25.52 KG/M2

## 2018-10-29 DIAGNOSIS — R20.2 PARESTHESIA: Primary | ICD-10-CM

## 2018-10-29 DIAGNOSIS — M54.5 CHRONIC RIGHT-SIDED LOW BACK PAIN, WITH SCIATICA PRESENCE UNSPECIFIED: ICD-10-CM

## 2018-10-29 DIAGNOSIS — G89.29 CHRONIC RIGHT-SIDED LOW BACK PAIN, WITH SCIATICA PRESENCE UNSPECIFIED: ICD-10-CM

## 2018-10-29 PROCEDURE — 99214 OFFICE O/P EST MOD 30 MIN: CPT | Mod: S$GLB,,, | Performed by: PHYSICIAN ASSISTANT

## 2018-10-29 RX ORDER — METHOCARBAMOL 750 MG/1
750 TABLET, FILM COATED ORAL 4 TIMES DAILY
Qty: 40 TABLET | Refills: 0 | Status: SHIPPED | OUTPATIENT
Start: 2018-10-29 | End: 2018-11-08

## 2018-10-29 RX ORDER — ETODOLAC 400 MG/1
400 TABLET, FILM COATED ORAL 2 TIMES DAILY
Qty: 30 TABLET | Refills: 0 | Status: SHIPPED | OUTPATIENT
Start: 2018-10-29 | End: 2018-11-13

## 2018-10-30 NOTE — PROGRESS NOTES
"Subjective:       Patient ID: Matilda Canales is a 43 y.o. female.    Vitals:  height is 5' (1.524 m) and weight is 59 kg (130 lb). Her oral temperature is 99.3 °F (37.4 °C). Her blood pressure is 128/80 and her pulse is 98. Her oxygen saturation is 98%.     Chief Complaint: leg tingling/ fingers turning blue    Pt reports discoloration of her fingers today and 2 days ago. She also reports intermittent "tingling of feet" and that her feet sometimes "feel hot on the bottom". She is also complaining of "sciatica" that has been ongoing but has recently worsened. She has a history of iron deficiency anemia.      Other   This is a new problem. Episode onset: 2 days ago. The problem occurs intermittently. The problem has been waxing and waning. Associated symptoms include numbness. Pertinent negatives include no abdominal pain, chest pain, chills, congestion, coughing, fever, headaches, joint swelling, myalgias, nausea, neck pain, rash, sore throat, swollen glands or vomiting. Nothing aggravates the symptoms. She has tried nothing for the symptoms.     Review of Systems   Constitution: Negative for chills and fever.   HENT: Negative for congestion and sore throat.    Eyes: Negative for blurred vision.   Cardiovascular: Negative for chest pain.   Respiratory: Negative for cough and shortness of breath.    Skin: Negative for rash.   Musculoskeletal: Negative for back pain, joint pain, joint swelling, myalgias and neck pain.   Gastrointestinal: Negative for abdominal pain, diarrhea, nausea and vomiting.   Neurological: Positive for numbness. Negative for headaches.   Psychiatric/Behavioral: The patient is not nervous/anxious.        Objective:      Physical Exam   Constitutional: She is oriented to person, place, and time. Vital signs are normal. She appears well-developed and well-nourished. She is active and cooperative.  Non-toxic appearance. She does not appear ill. No distress.   HENT:   Head: Normocephalic and " atraumatic.   Right Ear: Hearing, tympanic membrane, external ear and ear canal normal.   Left Ear: Hearing, tympanic membrane, external ear and ear canal normal.   Nose: Nose normal. No mucosal edema, rhinorrhea or nasal deformity. No epistaxis. Right sinus exhibits no maxillary sinus tenderness and no frontal sinus tenderness. Left sinus exhibits no maxillary sinus tenderness and no frontal sinus tenderness.   Mouth/Throat: Uvula is midline, oropharynx is clear and moist and mucous membranes are normal. No trismus in the jaw. Normal dentition. No uvula swelling. No posterior oropharyngeal erythema.   Eyes: Conjunctivae and lids are normal. Right eye exhibits no discharge. Left eye exhibits no discharge. No scleral icterus.   Sclera clear bilat   Neck: Trachea normal, normal range of motion, full passive range of motion without pain and phonation normal. Neck supple.   Cardiovascular: Normal rate, regular rhythm, normal heart sounds, intact distal pulses and normal pulses.   Pulmonary/Chest: Effort normal and breath sounds normal. No respiratory distress.   Abdominal: Soft. Normal appearance and bowel sounds are normal. She exhibits no distension, no abdominal bruit, no pulsatile midline mass and no mass. There is no tenderness.   Musculoskeletal: Normal range of motion. She exhibits no edema or deformity.        Lumbar back: She exhibits tenderness and pain. She exhibits normal range of motion, no bony tenderness, no swelling, no edema, no deformity and no laceration.        Back:    Negative SLR bilaterally   Neurological: She is alert and oriented to person, place, and time. She has normal strength and normal reflexes. No sensory deficit. She exhibits normal muscle tone. Coordination normal.   Skin: Skin is warm, dry and intact. Capillary refill takes less than 2 seconds. She is not diaphoretic. No pallor.   No discoloration of skin of hands or feet. Brisk capillary refill.   Psychiatric: She has a normal mood  and affect. Her speech is normal and behavior is normal. Judgment and thought content normal. Cognition and memory are normal.   Nursing note and vitals reviewed.      Assessment:       1. Paresthesia    2. Chronic right-sided low back pain, with sciatica presence unspecified        Plan:       - Pt instructed to follow up with primary care at next available appointment as symptoms may be related to iron deficiency anemia. Warning signs and symptoms discussed that might warrant an ED visit. Pt v/u all education and instruction. Discharged in stable condition.    Paresthesia    Chronic right-sided low back pain, with sciatica presence unspecified  -     etodolac (LODINE) 400 MG tablet; Take 1 tablet (400 mg total) by mouth 2 (two) times daily. for 15 days  Dispense: 30 tablet; Refill: 0  -     methocarbamol (ROBAXIN) 750 MG Tab; Take 1 tablet (750 mg total) by mouth 4 (four) times daily. for 10 days  Dispense: 40 tablet; Refill: 0

## 2018-11-10 DIAGNOSIS — M54.5 CHRONIC RIGHT-SIDED LOW BACK PAIN, WITH SCIATICA PRESENCE UNSPECIFIED: ICD-10-CM

## 2018-11-10 DIAGNOSIS — G89.29 CHRONIC RIGHT-SIDED LOW BACK PAIN, WITH SCIATICA PRESENCE UNSPECIFIED: ICD-10-CM

## 2018-11-12 RX ORDER — ETODOLAC 400 MG/1
TABLET, FILM COATED ORAL
Qty: 30 TABLET | Refills: 0 | OUTPATIENT
Start: 2018-11-12

## 2019-08-05 ENCOUNTER — HOSPITAL ENCOUNTER (EMERGENCY)
Facility: HOSPITAL | Age: 44
Discharge: HOME | End: 2019-08-05
Attending: EMERGENCY MEDICINE
Payer: MEDICAID

## 2019-08-05 VITALS
TEMPERATURE: 98 F | HEART RATE: 94 BPM | RESPIRATION RATE: 18 BRPM | OXYGEN SATURATION: 100 % | HEIGHT: 60 IN | BODY MASS INDEX: 25.52 KG/M2 | SYSTOLIC BLOOD PRESSURE: 135 MMHG | WEIGHT: 130 LBS | DIASTOLIC BLOOD PRESSURE: 86 MMHG

## 2019-08-05 DIAGNOSIS — R20.2 TINGLING: ICD-10-CM

## 2019-08-05 LAB
BUN SERPL-MCNC: 14 MG/DL (ref 6–30)
CHLORIDE SERPL-SCNC: 101 MMOL/L (ref 95–110)
CREAT SERPL-MCNC: 0.8 MG/DL (ref 0.5–1.4)
GLUCOSE SERPL-MCNC: 104 MG/DL (ref 70–110)
HCT VFR BLD CALC: 34 %PCV (ref 36–54)
POC IONIZED CALCIUM: 1.14 MMOL/L (ref 1.06–1.42)
POC TCO2 (MEASURED): 25 MMOL/L (ref 23–29)
POTASSIUM BLD-SCNC: 3.1 MMOL/L (ref 3.5–5.1)
SAMPLE: ABNORMAL
SODIUM BLD-SCNC: 140 MMOL/L (ref 136–145)

## 2019-08-05 PROCEDURE — 80047 BASIC METABLC PNL IONIZED CA: CPT

## 2019-08-05 PROCEDURE — 93005 ELECTROCARDIOGRAM TRACING: CPT

## 2019-08-05 PROCEDURE — 93010 ELECTROCARDIOGRAM REPORT: CPT | Mod: ,,, | Performed by: INTERNAL MEDICINE

## 2019-08-05 PROCEDURE — 99284 EMERGENCY DEPT VISIT MOD MDM: CPT | Mod: ,,, | Performed by: PHYSICIAN ASSISTANT

## 2019-08-05 PROCEDURE — 99283 EMERGENCY DEPT VISIT LOW MDM: CPT | Mod: 25

## 2019-08-05 PROCEDURE — 99284 PR EMERGENCY DEPT VISIT,LEVEL IV: ICD-10-PCS | Mod: ,,, | Performed by: PHYSICIAN ASSISTANT

## 2019-08-05 PROCEDURE — 93010 EKG 12-LEAD: ICD-10-PCS | Mod: ,,, | Performed by: INTERNAL MEDICINE

## 2019-08-05 NOTE — ED PROVIDER NOTES
Encounter Date: 8/5/2019       History     Chief Complaint   Patient presents with    Tingling     around her mouth that began when she woke from sleep, also reports numbness down R arm that has since resolved. reports similar symptoms with anxiety in the past. denies headache, blurry vision, neuro intact     44 year old female with medical history of anxiety, CALLIE presenting to the ED with the chief complaint of paresthesias. Patient reports developing tingling to both of her cheeks and around her mouth today about 2 hours PTA. Patient reports her symptoms have resolved at the time of my exam and denies any medical complaints. She expresses concern her paresthesias are related to anxiety. She denies vision changes, speech changes, dizziness, lightheadedness, unilateral extremity weakness. She was able to drive herself to the ED without difficulty.         Review of patient's allergies indicates:  No Known Allergies  Past Medical History:   Diagnosis Date    Anxiety 4/9/2015    Endometriosis     Fibroid, uterine     History of Helicobacter pylori infection 4/9/2015    CALLIE (iron deficiency anemia) 4/9/2015     Past Surgical History:   Procedure Laterality Date    ENDOMETRIAL ABLATION      MOUTH SURGERY      OOPHORECTOMY Right 2011    PELVIC LAPAROSCOPY  2011    Dr. Loo    RIGHT OOPHORECTOMY  2011    [place]- Dr Loo    TUBAL LIGATION  1997     Family History   Adopted: Yes   Problem Relation Age of Onset    Breast cancer Neg Hx     Colon cancer Neg Hx     Ovarian cancer Neg Hx      Social History     Tobacco Use    Smoking status: Never Smoker    Smokeless tobacco: Never Used   Substance Use Topics    Alcohol use: No    Drug use: Not on file     Review of Systems   Constitutional: Negative for chills, diaphoresis and fever.   HENT: Negative for sore throat.    Eyes: Negative for redness and visual disturbance.   Respiratory: Negative for shortness of breath.    Cardiovascular: Negative for  chest pain.   Gastrointestinal: Negative for abdominal pain, nausea and vomiting.   Genitourinary: Negative for dysuria and flank pain.   Musculoskeletal: Negative for back pain, neck pain and neck stiffness.   Skin: Negative for rash and wound.   Neurological: Positive for numbness (resolved). Negative for weakness, light-headedness and headaches.   Hematological: Does not bruise/bleed easily.       Physical Exam     Initial Vitals [08/05/19 0148]   BP Pulse Resp Temp SpO2   135/86 94 18 98.3 °F (36.8 °C) 100 %      MAP       --         Physical Exam    Constitutional: She appears well-developed and well-nourished. She is not diaphoretic. No distress.   HENT:   Head: Normocephalic and atraumatic.   Mouth/Throat: Oropharynx is clear and moist. No oropharyngeal exudate.   Eyes: EOM are normal. Pupils are equal, round, and reactive to light.   Neck: Normal range of motion. Neck supple.   Cardiovascular: Normal rate and regular rhythm.   Pulmonary/Chest: Breath sounds normal. No respiratory distress. She has no wheezes.   Abdominal: Soft. Bowel sounds are normal. There is no tenderness. There is no guarding.   Musculoskeletal: Normal range of motion. She exhibits no edema or tenderness.   Lymphadenopathy:     She has no cervical adenopathy.   Neurological: She is alert and oriented to person, place, and time. She has normal strength. No cranial nerve deficit or sensory deficit.   Follows commands appropriately. Ambulates without difficulty. No dysmetria, dysdiadochokinesia, peripheral vision deficits. Negative pronator drift and Romberg.   Skin: Skin is warm and dry. No erythema.       ED Course   Procedures  Labs Reviewed   ISTAT PROCEDURE - Abnormal; Notable for the following components:       Result Value    POC Potassium 3.1 (*)     POC Hematocrit 34 (*)     All other components within normal limits          Imaging Results    None          Medical Decision Making:   History:   Old Medical Records: I decided to  obtain old medical records.  Old Records Summarized: records from clinic visits.  Independently Interpreted Test(s):   I have ordered and independently interpreted EKG Reading(s) - see summary below       <> Summary of EKG Reading(s): NSR 84 bpm. No STEMI  Clinical Tests:   Lab Tests: Ordered and Reviewed  Medical Tests: Ordered and Reviewed       APC / Resident Notes:   44 year old female with medical history of anxiety, CALLIE presenting to the ED c/o facial paresthesias. Resolved at the time of my exam. DDx includes but not limited to electrolyte disturbance, medication side effect, anxiety, peripheral neuropathy, cardiac arrhythmia. I have considered but do not suspect cerebrovascular disease. Will check chem8 and ECG    Chem8 shows mild hypokalemia 3.1. HCT 34. Crt 0.8. Do not suspect emergent pathology at this time. Patient's symptoms resolved. No neurovascular deficits on exam. Patient expresses understanding and agreeable to the plan. Return to ED precautions given for new, worsening, or concerning symptoms. I have discussed the care of this patient with my supervising physician.                   Clinical Impression:       ICD-10-CM ICD-9-CM   1. Tingling R20.2 782.0         Disposition:   Disposition: Discharged  Condition: Stable                        Donavan Marie PA-C  08/05/19 0601

## 2019-08-05 NOTE — DISCHARGE INSTRUCTIONS
Follow-up with your primary care provider  Return to the ED for new, worsening, or concerning symptoms    Our goal in the emergency department is to always give you outstanding care and exceptional service. You may receive a survey by mail or e-mail in the next week regarding your experience in our ED. We would greatly appreciate your completing and returning the survey. Your feedback provides us with a way to recognize our staff who give very good care and it helps us learn how to improve when your experience was below our aspiration of excellence.

## 2019-08-05 NOTE — ED TRIAGE NOTES
Matilda Canales, a 44 y.o. female presents to the ED w/ complaint of numbness/tingling to the face and right upper extremity this morning. Pt states that it happened for approximately fifteen minutes and has resolved PTA. Pt states that it felt something similar to her anxiety attack - pt is prescribed xanax for anxiety. Pt denies any headache, chest pain, sob, abdominal pain, n/v/d, & blurry vision. Neruo intact.    Triage note:  Chief Complaint   Patient presents with    Tingling     around her mouth that began when she woke from sleep, also reports numbness down R arm that has since resolved. reports similar symptoms with anxiety in the past. denies headache, blurry vision, neuro intact     Review of patient's allergies indicates:  No Known Allergies  Past Medical History:   Diagnosis Date    Anxiety 4/9/2015    Endometriosis     Fibroid, uterine     History of Helicobacter pylori infection 4/9/2015    CALLIE (iron deficiency anemia) 4/9/2015     Adult Physical Assessment  LOC: Matilda Canales, 44 y.o. female verified via two identifiers.  The patient is awake, alert, oriented and speaking appropriately at this time.  APPEARANCE: Patient resting comfortably and appears to be in no acute distress at this time. Patient is clean and well groomed, patient's clothing is properly fastened. Anxiety.  SKIN:The skin is warm and dry, color consistent with ethnicity, patient has normal skin turgor and moist mucus membranes, skin intact, no breakdown or brusing noted.  MUSCULOSKELETAL: Patient moving all extremities well, no obvious swelling or deformities noted.  RESPIRATORY: Airway is open and patent, respirations are spontaneous, patient has a normal effort and rate, no accessory muscle use noted.  CARDIAC: Patient has a normal rate and rhythm, no periphreal edema noted in any extremity, capillary refill < 3 seconds in all extremities  ABDOMEN: Soft and non tender to palpation, no abdominal distention noted.  NEUROLOGIC:  Eyes open spontaneously, behavior appropriate to situation, follows commands, facial expression symmetrical, bilateral hand grasp equal and even, purposeful motor response noted, normal sensation in all extremities when touched with a finger. Numbness/tingling to face and right upper extremity that has now resolved.

## 2019-09-16 ENCOUNTER — TELEPHONE (OUTPATIENT)
Dept: OBSTETRICS AND GYNECOLOGY | Facility: CLINIC | Age: 44
End: 2019-09-16

## 2019-09-16 DIAGNOSIS — Z12.31 VISIT FOR SCREENING MAMMOGRAM: Primary | ICD-10-CM

## 2019-09-16 NOTE — TELEPHONE ENCOUNTER
----- Message from Nadine Baumann MA sent at 9/16/2019  1:15 PM CDT -----  The pt is needing a mammo.  The pt can be reached at [phone].

## 2019-09-30 ENCOUNTER — HOSPITAL ENCOUNTER (OUTPATIENT)
Dept: RADIOLOGY | Facility: OTHER | Age: 44
Discharge: HOME | End: 2019-09-30
Attending: OBSTETRICS & GYNECOLOGY
Payer: MEDICAID

## 2019-09-30 ENCOUNTER — OFFICE VISIT (OUTPATIENT)
Dept: OBSTETRICS AND GYNECOLOGY | Facility: CLINIC | Age: 44
End: 2019-09-30
Payer: MEDICAID

## 2019-09-30 VITALS
WEIGHT: 131.81 LBS | DIASTOLIC BLOOD PRESSURE: 82 MMHG | HEIGHT: 60 IN | BODY MASS INDEX: 25.88 KG/M2 | SYSTOLIC BLOOD PRESSURE: 125 MMHG

## 2019-09-30 DIAGNOSIS — N63.0 BREAST MASS: ICD-10-CM

## 2019-09-30 DIAGNOSIS — Z12.31 VISIT FOR SCREENING MAMMOGRAM: Primary | ICD-10-CM

## 2019-09-30 DIAGNOSIS — Z01.419 ENCOUNTER FOR GYNECOLOGICAL EXAMINATION WITHOUT ABNORMAL FINDING: ICD-10-CM

## 2019-09-30 DIAGNOSIS — Z12.31 VISIT FOR SCREENING MAMMOGRAM: ICD-10-CM

## 2019-09-30 DIAGNOSIS — N60.19 FIBROCYSTIC BREAST CHANGES, UNSPECIFIED LATERALITY: ICD-10-CM

## 2019-09-30 PROCEDURE — 77066 DX MAMMO INCL CAD BI: CPT | Mod: 26,,, | Performed by: RADIOLOGY

## 2019-09-30 PROCEDURE — 99999 PR PBB SHADOW E&M-EST. PATIENT-LVL III: ICD-10-PCS | Mod: PBBFAC,,, | Performed by: OBSTETRICS & GYNECOLOGY

## 2019-09-30 PROCEDURE — 99396 PR PREVENTIVE VISIT,EST,40-64: ICD-10-PCS | Mod: S$PBB,,, | Performed by: OBSTETRICS & GYNECOLOGY

## 2019-09-30 PROCEDURE — 77066 MAMMO DIGITAL DIAGNOSTIC BILAT WITH TOMOSYNTHESIS_CAD: ICD-10-PCS | Mod: 26,,, | Performed by: RADIOLOGY

## 2019-09-30 PROCEDURE — 99396 PREV VISIT EST AGE 40-64: CPT | Mod: S$PBB,,, | Performed by: OBSTETRICS & GYNECOLOGY

## 2019-09-30 PROCEDURE — 77062 MAMMO DIGITAL DIAGNOSTIC BILAT WITH TOMOSYNTHESIS_CAD: ICD-10-PCS | Mod: 26,,, | Performed by: RADIOLOGY

## 2019-09-30 PROCEDURE — 77062 BREAST TOMOSYNTHESIS BI: CPT | Mod: 26,,, | Performed by: RADIOLOGY

## 2019-09-30 PROCEDURE — 76642 ULTRASOUND BREAST LIMITED: CPT | Mod: 26,RT,, | Performed by: RADIOLOGY

## 2019-09-30 PROCEDURE — 99999 PR PBB SHADOW E&M-EST. PATIENT-LVL III: CPT | Mod: PBBFAC,,, | Performed by: OBSTETRICS & GYNECOLOGY

## 2019-09-30 PROCEDURE — 99213 OFFICE O/P EST LOW 20 MIN: CPT | Mod: PBBFAC,25 | Performed by: OBSTETRICS & GYNECOLOGY

## 2019-09-30 PROCEDURE — 76642 ULTRASOUND BREAST LIMITED: CPT | Mod: TC,RT

## 2019-09-30 PROCEDURE — 76642 US BREAST RIGHT LIMITED: ICD-10-PCS | Mod: 26,RT,, | Performed by: RADIOLOGY

## 2019-09-30 PROCEDURE — 77066 DX MAMMO INCL CAD BI: CPT | Mod: TC

## 2019-09-30 RX ORDER — PANTOPRAZOLE SODIUM 40 MG/1
TABLET, DELAYED RELEASE ORAL
COMMUNITY
Start: 2015-06-01

## 2019-09-30 NOTE — PROGRESS NOTES
HISTORY OF PRESENT ILLNESS:    Matilda Canales is a 44 y.o. female, , No LMP recorded (lmp unknown). Patient has had an ablation.,  presents for a routine exam and has no complaints.  Breast pain last month.       Past Medical History:   Diagnosis Date    Anxiety 2015    Endometriosis     Fibroid, uterine     History of Helicobacter pylori infection 2015    CALLIE (iron deficiency anemia) 2015       Past Surgical History:   Procedure Laterality Date    ENDOMETRIAL ABLATION      MOUTH SURGERY      OOPHORECTOMY Right     PELVIC LAPAROSCOPY      Dr. Loo    RIGHT OOPHORECTOMY      [place]- Dr Loo    TUBAL LIGATION         MEDICATIONS AND ALLERGIES:      Current Outpatient Medications:     alprazolam (XANAX) 0.25 MG tablet, , Disp: , Rfl: 0    ALPRAZolam (XANAX) 0.25 MG tablet, Take 0.25 mg by mouth 3 (three) times daily as needed., Disp: , Rfl:     ALPRAZolam (XANAX) 0.5 MG tablet, TK 1/2-1 T PO QD PRN, Disp: , Rfl: 0    biotin 5 mg Tab, Take by mouth., Disp: , Rfl:     dextroamphetamine-amphetamine 10 mg Tab, TK ONE T PO  QAM AND 1 T PO AT NOON FOR ADHD, Disp: , Rfl: 0    diazePAM (VALIUM) 5 MG tablet, TK 1 T PO 30 MINUTES PRIOR TO PROCEDURE FOR ANXIETY. DO NOT TAKE MORE THAN 2 T IN 24 HOURS, Disp: , Rfl: 2    DIFFERIN 0.1 % gel, APPLY THINLY TO FACE QOD, Disp: , Rfl: 1    ferrous sulfate 324 mg (65 mg iron) TbEC, Take 325 mg by mouth once daily., Disp: , Rfl:     fish oil-omega-3 fatty acids 300-1,000 mg capsule, Take 2 g by mouth once daily., Disp: , Rfl:     folic acid (FOLVITE) 400 MCG tablet, Take 400 mcg by mouth once daily., Disp: , Rfl:     hydroCHLOROthiazide (HYDRODIURIL) 12.5 MG Tab, TK 1 T PO QD, Disp: , Rfl: 0    lisinopril (PRINIVIL,ZESTRIL) 5 MG tablet, Take 5 mg by mouth., Disp: , Rfl:     lisinopril 10 MG tablet, TK 1 T PO ONCE D, Disp: , Rfl: 0    lisinopril-hydrochlorothiazide (PRINZIDE,ZESTORETIC) 10-12.5 mg per tablet, TK 1 T PO  QAM FOR  HIGH BLOOD PRESSURE, Disp: , Rfl: 1    multivitamin capsule, Take 1 capsule by mouth once daily., Disp: , Rfl:     pantoprazole (PROTONIX) 40 MG tablet, TK 1 T PO  QD PRF ACID REFLUX, Disp: , Rfl: 3    pantoprazole (PROTONIX) 40 MG tablet, TAKE 1 TABLET BY MOUTH DAILY, Disp: , Rfl:     promethazine-dextromethorphan (PROMETHAZINE-DM) 6.25-15 mg/5 mL Syrp, TK 5MLS PO Q 8 H FOR 7 DAYS, Disp: , Rfl: 0    ranitidine (ZANTAC) 150 MG tablet, Take 150 mg by mouth 2 (two) times daily., Disp: , Rfl:     sumatriptan (IMITREX) 50 MG tablet, TK 1 T PO AT ONSET OF HEADACHE. MAY TAKE ANOTHER IN 2 H IF SYMPTOMS PERSIST. DO NOT TK MORE THAN 2 T IN A DAY, Disp: , Rfl: 2    sumatriptan (IMITREX) 50 MG tablet, Take 50 mg by mouth every 2 (two) hours as needed., Disp: , Rfl:     Review of patient's allergies indicates:  No Known Allergies    Family History   Adopted: Yes   Problem Relation Age of Onset    Breast cancer Neg Hx     Colon cancer Neg Hx     Ovarian cancer Neg Hx        Social History     Socioeconomic History    Marital status: Single     Spouse name: Not on file    Number of children: Not on file    Years of education: Not on file    Highest education level: Not on file   Occupational History    Not on file   Social Needs    Financial resource strain: Not on file    Food insecurity:     Worry: Not on file     Inability: Not on file    Transportation needs:     Medical: Not on file     Non-medical: Not on file   Tobacco Use    Smoking status: Never Smoker    Smokeless tobacco: Never Used   Substance and Sexual Activity    Alcohol use: No    Drug use: Not on file    Sexual activity: Yes     Partners: Male   Lifestyle    Physical activity:     Days per week: Not on file     Minutes per session: Not on file    Stress: Not on file   Relationships    Social connections:     Talks on phone: Not on file     Gets together: Not on file     Attends Holiness service: Not on file     Active member of club or  organization: Not on file     Attends meetings of clubs or organizations: Not on file     Relationship status: Not on file   Other Topics Concern    Not on file   Social History Narrative    Not on file       COMPREHENSIVE GYN HISTORY:  PAP History: Denies abnormal Paps.  Infection History: Denies STDs. Denies PID.  Benign History: Denies uterine fibroids. Denies ovarian cysts. Denies endometriosis. Denies other conditions.  Cancer History: Denies cervical cancer. Denies uterine cancer or hyperplasia. Denies ovarian cancer. Denies vulvar cancer or pre-cancer. Denies vaginal cancer or pre-cancer. Denies breast cancer. Denies colon cancer.  Sexual Activity History: Reports currently being sexually active  Menstrual History: Monthly. Mod then light flow.   Dysmenorrhea History: Reports mild dysmenorrhea.       ROS:  GENERAL: No weight changes. No swelling. No fatigue. No fever.  CARDIOVASCULAR: No chest pain. No shortness of breath. No leg cramps.   NEUROLOGICAL: No headaches. No vision changes.  BREASTS: No pain. No lumps. No discharge.  ABDOMEN: No pain. No nausea. No vomiting. No diarrhea. No constipation.  REPRODUCTIVE: No abnormal bleeding.   VULVA: No pain. No lesions. No itching.  VAGINA: No relaxation. No itching. No odor. No discharge. No lesions.  URINARY: No incontinence. No nocturia. No frequency. No dysuria.    /82   Ht 5' (1.524 m)   Wt 59.8 kg (131 lb 13.4 oz)   LMP  (LMP Unknown)   BMI 25.75 kg/m²     PE:  APPEARANCE: Well nourished, well developed, in no acute distress.  AFFECT: WNL, alert and oriented x 3.  SKIN: No acne or hirsutism.  NECK: Neck symmetric, without masses or thyromegaly.  NODES: No inguinal, cervical, axillary or femoral lymph node enlargement.  CHEST: Good respiratory effort.   ABDOMEN: Soft. No tenderness or masses. No hepatosplenomegaly. No hernias. Midline scar   BREASTS: Symmetrical, no skin changes, visible lesions, palpable masses or nipple discharge bilaterally.  Right breast with 2 cm cystic are at 9 o'clock.   PELVIC: External female genitalia without lesions.  Female hair distribution. Adequate perineal body, Normal urethral meatus. Vagina moist and well rugated without lesions or discharge.  No significant cystocele or rectocele present. Cervix pink without lesions, discharge or tenderness. Uterus is 4-6 week size, regular, mobile and nontender. Adnexa without masses or tenderness.  EXTREMITIES: No edema    DIAGNOSIS:  1. Visit for screening mammogram    2. Encounter for gynecological examination without abnormal finding    3. Fibrocystic breast changes, unspecified laterality    4. Breast mass        PLAN:    Orders Placed This Encounter    Mammo Digital Diagnostic Bilat with Damián       COUNSELING:  The patient was counseled today on:  -A.C.S. Pap and pelvic exam guidelines (pap every 3 years), recomendations for yearly mammogram;  -to follow up with her PCP for other health maintenance.    FOLLOW-UP with me annually.

## 2019-10-24 ENCOUNTER — TELEPHONE (OUTPATIENT)
Dept: OBSTETRICS AND GYNECOLOGY | Facility: CLINIC | Age: 44
End: 2019-10-24

## 2019-10-24 NOTE — TELEPHONE ENCOUNTER
----- Message from Trinidad Waller sent at 10/24/2019 10:34 AM CDT -----  Contact: BUSTER JARRELL [[id number]]   Name of Who is Calling: BUSTER JARRELL [[id number]]    What is the request in detail:  Patient request call back in reference to pap results Please contact to further discuss and advise      Can the clinic reply by [facility]: no     What [facility] Back [facility]:  [phone]

## 2019-11-14 ENCOUNTER — TELEPHONE (OUTPATIENT)
Dept: OBSTETRICS AND GYNECOLOGY | Facility: CLINIC | Age: 44
End: 2019-11-14

## 2019-11-14 NOTE — TELEPHONE ENCOUNTER
----- Message from Niranjan Nolan, Patient Care Assistant sent at 11/14/2019 11:40 AM CST -----  Contact: BUSTER JARRELL [[id number]]  Type:  Patient Returning Call    Who Called: BUSTER JARRELL [[id number]]    Who Left Message for Patient: Daria Dash    Does the patient know what this is regarding?:Yes    Best Call Back Number:[phone]    Additional Information:   None

## 2019-11-14 NOTE — TELEPHONE ENCOUNTER
----- Message from Denisse Cuba sent at 11/14/2019  2:02 PM CST -----  Contact: BUSTER JARRELL    Type:  Patient Returning Call    Who Called: BUSTER JARRELL     Who Left Message for Patient:jesus    Does the patient know what this is regarding?yes    Best Call Back Number:[phone]    Additional Information:

## 2019-12-17 ENCOUNTER — TELEPHONE (OUTPATIENT)
Dept: OBSTETRICS AND GYNECOLOGY | Facility: CLINIC | Age: 44
End: 2019-12-17

## 2019-12-17 NOTE — TELEPHONE ENCOUNTER
Pt is having a tummy tuck done on Friday and wanted to know if this is safe with her endometriosis.  Pt wanted to know what she can take for the endometriosis pain she is having. Pt was trying to be seen today or tomorrow and she was informed that our schedule is full and no opening's until the new year but she can see one of her partners. Pt states that she just wants to ask the doctor what she can take for the endometriosis pain.

## 2019-12-17 NOTE — TELEPHONE ENCOUNTER
----- Message from Eugenia Garza sent at 12/17/2019 11:43 AM CST -----  Contact: BUSTER JARRELL [[id number]]  Name of Who is Calling : BUSTER JARRELL [[id number]]    Patient is returning a call from staff in regards to getting a appointment sooner than what is available  .....Please contact to further discuss and advise.    Can the clinic reply by [facility] : No    What Number to Call Back :  [phone]

## 2019-12-26 ENCOUNTER — HOSPITAL ENCOUNTER (EMERGENCY)
Facility: HOSPITAL | Age: 44
Discharge: HOME | End: 2019-12-26
Attending: EMERGENCY MEDICINE
Payer: MEDICAID

## 2019-12-26 VITALS
HEIGHT: 60 IN | WEIGHT: 135 LBS | BODY MASS INDEX: 26.5 KG/M2 | HEART RATE: 85 BPM | SYSTOLIC BLOOD PRESSURE: 117 MMHG | RESPIRATION RATE: 18 BRPM | TEMPERATURE: 98 F | OXYGEN SATURATION: 98 % | DIASTOLIC BLOOD PRESSURE: 69 MMHG

## 2019-12-26 DIAGNOSIS — Z98.890 POST-OPERATIVE STATE: ICD-10-CM

## 2019-12-26 DIAGNOSIS — N39.0 URINARY TRACT INFECTION WITHOUT HEMATURIA, SITE UNSPECIFIED: Primary | ICD-10-CM

## 2019-12-26 LAB
ANION GAP SERPL CALC-SCNC: 9 MMOL/L (ref 8–16)
B-HCG UR QL: NEGATIVE
BACTERIA #/AREA URNS AUTO: ABNORMAL /HPF
BASOPHILS # BLD AUTO: 0.03 K/UL (ref 0–0.2)
BASOPHILS NFR BLD: 0.4 % (ref 0–1.9)
BILIRUB UR QL STRIP: NEGATIVE
BUN SERPL-MCNC: 13 MG/DL (ref 6–20)
CALCIUM SERPL-MCNC: 9.8 MG/DL (ref 8.7–10.5)
CHLORIDE SERPL-SCNC: 102 MMOL/L (ref 95–110)
CLARITY UR REFRACT.AUTO: CLEAR
CO2 SERPL-SCNC: 29 MMOL/L (ref 23–29)
COLOR UR AUTO: ABNORMAL
CREAT SERPL-MCNC: 0.8 MG/DL (ref 0.5–1.4)
CTP QC/QA: YES
DIFFERENTIAL METHOD: ABNORMAL
EOSINOPHIL # BLD AUTO: 0.1 K/UL (ref 0–0.5)
EOSINOPHIL NFR BLD: 1.1 % (ref 0–8)
ERYTHROCYTE [DISTWIDTH] IN BLOOD BY AUTOMATED COUNT: 13.1 % (ref 11.5–14.5)
EST. GFR  (AFRICAN AMERICAN): >60 ML/MIN/1.73 M^2
EST. GFR  (NON AFRICAN AMERICAN): >60 ML/MIN/1.73 M^2
GLUCOSE SERPL-MCNC: 95 MG/DL (ref 70–110)
GLUCOSE UR QL STRIP: NEGATIVE
HCT VFR BLD AUTO: 35.3 % (ref 37–48.5)
HGB BLD-MCNC: 11.4 G/DL (ref 12–16)
HGB UR QL STRIP: NEGATIVE
IMM GRANULOCYTES # BLD AUTO: 0.01 K/UL (ref 0–0.04)
IMM GRANULOCYTES NFR BLD AUTO: 0.1 % (ref 0–0.5)
KETONES UR QL STRIP: NEGATIVE
LACTATE SERPL-SCNC: 1.1 MMOL/L (ref 0.5–2.2)
LEUKOCYTE ESTERASE UR QL STRIP: ABNORMAL
LYMPHOCYTES # BLD AUTO: 1.6 K/UL (ref 1–4.8)
LYMPHOCYTES NFR BLD: 20 % (ref 18–48)
MCH RBC QN AUTO: 28.6 PG (ref 27–31)
MCHC RBC AUTO-ENTMCNC: 32.3 G/DL (ref 32–36)
MCV RBC AUTO: 89 FL (ref 82–98)
MICROSCOPIC COMMENT: ABNORMAL
MONOCYTES # BLD AUTO: 0.6 K/UL (ref 0.3–1)
MONOCYTES NFR BLD: 7.5 % (ref 4–15)
NEUTROPHILS # BLD AUTO: 5.7 K/UL (ref 1.8–7.7)
NEUTROPHILS NFR BLD: 70.9 % (ref 38–73)
NITRITE UR QL STRIP: NEGATIVE
NRBC BLD-RTO: 0 /100 WBC
PH UR STRIP: 6 [PH] (ref 5–8)
PLATELET # BLD AUTO: 291 K/UL (ref 150–350)
PMV BLD AUTO: 8.9 FL (ref 9.2–12.9)
POTASSIUM SERPL-SCNC: 3.4 MMOL/L (ref 3.5–5.1)
PROT UR QL STRIP: NEGATIVE
RBC # BLD AUTO: 3.99 M/UL (ref 4–5.4)
RBC #/AREA URNS AUTO: 1 /HPF (ref 0–4)
SODIUM SERPL-SCNC: 140 MMOL/L (ref 136–145)
SP GR UR STRIP: 1.01 (ref 1–1.03)
SQUAMOUS #/AREA URNS AUTO: 4 /HPF
URN SPEC COLLECT METH UR: ABNORMAL
WBC # BLD AUTO: 8.08 K/UL (ref 3.9–12.7)
WBC #/AREA URNS AUTO: 15 /HPF (ref 0–5)

## 2019-12-26 PROCEDURE — 81001 URINALYSIS AUTO W/SCOPE: CPT

## 2019-12-26 PROCEDURE — 81025 URINE PREGNANCY TEST: CPT | Performed by: EMERGENCY MEDICINE

## 2019-12-26 PROCEDURE — 63600175 PHARM REV CODE 636 W HCPCS: Performed by: EMERGENCY MEDICINE

## 2019-12-26 PROCEDURE — 87086 URINE CULTURE/COLONY COUNT: CPT

## 2019-12-26 PROCEDURE — 85025 COMPLETE CBC W/AUTO DIFF WBC: CPT

## 2019-12-26 PROCEDURE — 99284 EMERGENCY DEPT VISIT MOD MDM: CPT | Mod: ,,, | Performed by: EMERGENCY MEDICINE

## 2019-12-26 PROCEDURE — 96360 HYDRATION IV INFUSION INIT: CPT | Mod: 59

## 2019-12-26 PROCEDURE — 80048 BASIC METABOLIC PNL TOTAL CA: CPT

## 2019-12-26 PROCEDURE — 25500020 PHARM REV CODE 255: Performed by: EMERGENCY MEDICINE

## 2019-12-26 PROCEDURE — 83605 ASSAY OF LACTIC ACID: CPT

## 2019-12-26 PROCEDURE — 99285 EMERGENCY DEPT VISIT HI MDM: CPT | Mod: 25

## 2019-12-26 PROCEDURE — 99284 PR EMERGENCY DEPT VISIT,LEVEL IV: ICD-10-PCS | Mod: ,,, | Performed by: EMERGENCY MEDICINE

## 2019-12-26 PROCEDURE — 25000003 PHARM REV CODE 250: Performed by: EMERGENCY MEDICINE

## 2019-12-26 RX ORDER — CEPHALEXIN 500 MG/1
500 CAPSULE ORAL
Status: COMPLETED | OUTPATIENT
Start: 2019-12-26 | End: 2019-12-26

## 2019-12-26 RX ORDER — OXYCODONE AND ACETAMINOPHEN 5; 325 MG/1; MG/1
1 TABLET ORAL EVERY 4 HOURS PRN
COMMUNITY

## 2019-12-26 RX ORDER — CEPHALEXIN 500 MG/1
500 CAPSULE ORAL EVERY 12 HOURS
Qty: 14 CAPSULE | Refills: 0 | Status: SHIPPED | OUTPATIENT
Start: 2019-12-26 | End: 2020-01-02

## 2019-12-26 RX ADMIN — CEPHALEXIN 500 MG: 500 CAPSULE ORAL at 08:12

## 2019-12-26 RX ADMIN — IOHEXOL 100 ML: 350 INJECTION, SOLUTION INTRAVENOUS at 09:12

## 2019-12-26 RX ADMIN — SODIUM CHLORIDE 1000 ML: 0.9 INJECTION, SOLUTION INTRAVENOUS at 06:12

## 2019-12-26 NOTE — ED TRIAGE NOTES
"Patient arrives via EMS with concerns of blood pressure "almost to 160" 156/also states her heart rate was up to 135 per blood pressure cuff reading. States her surgeon told her to call 911 of her HR is to 100. Surgery last Friday. Xanax 30 min PTA, took a "piece of Xanax, piece of Adderral and piece of Imitrex" todauy  "

## 2019-12-26 NOTE — ED PROVIDER NOTES
"Encounter Date: 12/26/2019    SCRIBE #1 NOTE: I, Aubree Maravilla, am scribing for, and in the presence of,  Raphael Gilbert MD. I have scribed the following portions of the note - Other sections scribed: HPI ROS PE.       History     Chief Complaint   Patient presents with    Headache     Pt c/o headache and anxiety. Pt states she had a tummy tuck this past Friday and is concerned for a blood clot.     Anxiety     HPI   Ms. Canales is a 44 y.o. female with endometriosis, anxiety, here today with tachycardia. Patient reports having a "tummy tuck" on 12/20 and was advised to come to the ED if her heart rate was over 100. She reports as she was resting and watching TV today, she took her blood pressure and her heart rate which were 135 BPM and 159 systolic. She reports 4 days after her surgery, she had a bout of fast breathing once every 30 -40 minutes. Patient reports having her surgery done at [facility]. Endorses headache which she reports may be affecting her heart rate and blood pressure. Denies drainage or pus from wound. Denies purulent drainage from ANGELA drain. Denies cough, fever, significant abdominal pain, chest pain, or trouble breathing.       Review of patient's allergies indicates:   Allergen Reactions    Dilaudid [hydromorphone (bulk)] Nausea And Vomiting     Past Medical History:   Diagnosis Date    Anxiety 4/9/2015    Endometriosis     Fibroid, uterine     History of Helicobacter pylori infection 4/9/2015    CALLIE (iron deficiency anemia) 4/9/2015     Past Surgical History:   Procedure Laterality Date    ABDOMINAL SURGERY      ENDOMETRIAL ABLATION      MOUTH SURGERY      OOPHORECTOMY Right 2011    PELVIC LAPAROSCOPY  2011    Dr. Loo    RIGHT OOPHORECTOMY  2011    [place]- Dr Loo    TUBAL LIGATION  1997     Family History   Adopted: Yes   Problem Relation Age of Onset    Breast cancer Neg Hx     Colon cancer Neg Hx     Ovarian cancer Neg Hx      Social History     Tobacco Use    Smoking " status: Never Smoker    Smokeless tobacco: Never Used   Substance Use Topics    Alcohol use: No    Drug use: Not on file     Review of Systems   Constitutional: Negative for fever.   HENT: Negative for sore throat.    Respiratory: Negative for shortness of breath.    Cardiovascular: Negative for chest pain.        +Tachycardia   Gastrointestinal: Negative for nausea.   Genitourinary: Negative for dysuria.   Musculoskeletal: Negative for back pain.   Skin: Negative for rash.   Neurological: Positive for headaches. Negative for weakness.   Hematological: Does not bruise/bleed easily.       Physical Exam     Initial Vitals   BP Pulse Resp Temp SpO2   12/26/19 1700 12/26/19 1700 12/26/19 1700 12/26/19 1701 12/26/19 1700   136/88 103 19 98.3 °F (36.8 °C) 100 %      MAP       --                Physical Exam    Nursing note and vitals reviewed.  Constitutional: She appears well-developed and well-nourished. She is not diaphoretic. No distress.   HENT:   Head: Normocephalic and atraumatic.   Right Ear: External ear normal.   Left Ear: External ear normal.   Neck: Neck supple.   Cardiovascular: Regular rhythm, normal heart sounds and intact distal pulses. Tachycardia present.    Pulmonary/Chest: Breath sounds normal. No respiratory distress. She has no wheezes. She has no rhonchi. She has no rales.   Abdominal: Soft. She exhibits no distension. There is no tenderness. There is no rebound and no guarding.   Suprapubic horizontal surgical incision extending from flank to flank with appropriate surrounding tenderness to palpation without drainage or erythema. ANGELA drain on right lower quadrant with serosanguinous discharge without purulent.    Neurological: She is alert and oriented to person, place, and time. GCS score is 15. GCS eye subscore is 4. GCS verbal subscore is 5. GCS motor subscore is 6.   Skin: Skin is warm. Capillary refill takes less than 2 seconds. No rash noted.   Psychiatric: She has a normal mood and affect.          ED Course   Procedures  Labs Reviewed   CBC W/ AUTO DIFFERENTIAL - Abnormal; Notable for the following components:       Result Value    RBC 3.99 (*)     Hemoglobin 11.4 (*)     Hematocrit 35.3 (*)     MPV 8.9 (*)     All other components within normal limits   BASIC METABOLIC PANEL - Abnormal; Notable for the following components:    Potassium 3.4 (*)     All other components within normal limits   URINALYSIS, REFLEX TO URINE CULTURE - Abnormal; Notable for the following components:    Leukocytes, UA 1+ (*)     All other components within normal limits    Narrative:     Preferred Collection Type->Urine, Clean Catch   URINALYSIS MICROSCOPIC - Abnormal; Notable for the following components:    WBC, UA 15 (*)     All other components within normal limits    Narrative:     Preferred Collection Type->Urine, Clean Catch   CULTURE, URINE    Narrative:     Preferred Collection Type->Urine, Clean Catch   LACTIC ACID, PLASMA   POCT URINE PREGNANCY          Imaging Results          CTA Chest Non-Coronary (PE Study) (Final result)  Result time 12/26/19 22:32:55    Final result by Derik Shipman MD (12/26/19 22:32:55)                 Impression:      No pulmonary thromboembolism, pulmonary infarct or evidence of elevated right heart pressures.    Electronically signed by resident: India Frazier  Date:    12/26/2019  Time:    21:34    Electronically signed by: Derik Shipman MD  Date:    12/26/2019  Time:    22:32             Narrative:    EXAMINATION:  CTA CHEST NON CORONARY    CLINICAL HISTORY:  Chest pain, acute, PE suspected, high pretest prob;    TECHNIQUE:  Low dose axial images, sagittal and coronal reformations were obtained from the thoracic inlet to the lung bases following the IV administration of 100 cc of Omnipaque 350.  Contrast timing was optimized to evaluate the pulmonary arteries.    COMPARISON:  CT abdomen pelvis 12/26/2019.    FINDINGS:  Base of Neck:  No significant abnormality.    Thoracic  soft tissues:  No significant abnormality.    Vasculature: Left-sided aortic arch with normal branching pattern, caliber, contour, and course without significant atherosclerosis.  Variant venous anatomy with a vein draining from the left subclavian vein, coursing over the proximal descending aorta to drain into the azygos vein.    Heart: Heart is not enlarged, specifically no right ventricular enlargement.  The interventricular septum demonstrates a normal contour.  No pericardial effusion.    Pulmonary vasculature: The pulmonary arteries distribute normally without evidence of filling defect to indicate pulmonary thromboembolism.  There are four pulmonary veins.  Systemic and pulmonary venoatrial connections are concordant    Emma/Mediastinum:  No hilar or mediastinal lymphadenopathy.    Airways:  Trachea is midline and proximal airways are patent without significant abnormality.    Lungs/pleura: Mild bibasilar subsegmental atelectasis.  No consolidation, mass, pleural effusion, or pneumothorax.    Esophagus: Normal in course and caliber.    Upper abdomen: Please refer to concomitantly performed CT abdomen pelvis for more detailed evaluation.    Bones: Unremarkable without acute fracture or lytic or sclerotic lesions.                               CT Abdomen Pelvis With Contrast (Final result)  Result time 12/26/19 22:52:07    Final result by Derik Shipman MD (12/26/19 22:52:07)                 Impression:      Postsurgical changes of recent abdominoplasty with a surgical drain coursing through the subcutaneous tissues of the anterior abdominal wall without associated abnormal fluid collections.    Small uterine fibroids.    Additional incidental findings, as above.    Electronically signed by resident: India Frazier  Date:    12/26/2019  Time:    21:42    Electronically signed by: Derik Shipman MD  Date:    12/26/2019  Time:    22:52             Narrative:    EXAMINATION:  CT ABDOMEN PELVIS WITH  CONTRAST    CLINICAL HISTORY:  post abdominoplasty.  No additional history given.    TECHNIQUE:  Low dose axial images, sagittal and coronal reformations were obtained from the lung bases to the pubic symphysis following the IV administration of 100 mL of Omnipaque 350 .  Oral contrast was not given. Postcontrast images were also obtained in the delayed phase.    COMPARISON:  CTA chest 12/26/2019.    FINDINGS:  Please refer to concomitantly performed CTA chest for a more detailed evaluation of the intrathoracic structures.    Liver: Normal in size and attenuation without focal hepatic abnormality.    Gallbladder: Normal in appearance without evidence for cholecystitis.    Bile Ducts: No intra or extrahepatic biliary ductal dilation.    Pancreas: No pancreatic mass lesion or peripancreatic inflammatory change.    Spleen: Unremarkable.    Adrenals: Unremarkable.    Kidneys/ Ureters: Normal in size and location.  Kidneys enhance normally.  No focal renal abnormality or nephrolithiasis.  No hydroureteronephrosis.    Bladder: Distended with smooth contours and no bladder wall thickening.    Reproductive organs: Uterus demonstrates a mildly lobulated contour with a cluster of calcifications, favored to represent uterine fibroids.  Bilateral adnexa appear unremarkable.    GI Tract/Mesentery: The stomach is unremarkable.  Visualized loops of small and large bowel are normal in caliber without evidence for obstruction or inflammation. The appendix is visualized and is unremarkable.  Few colonic diverticula without evidence of diverticulitis.    Peritoneal Space: No abdominopelvic ascites, intraperitoneal free air, or significant adenopathy.    Retroperitoneum: No significant adenopathy.    Abdominal wall/extraperitoneal soft tissues: Surgical drain entering from the the right hemiabdomen extending across the midline throughout the subcutaneous tissues of the anterior abdominal wall with distal tip terminating within the left  lower quadrant subcutaneous tissues.  Mild inflammatory change and subcutaneous emphysema within the lower anterior abdominal wall in keeping with history of recent abdominoplasty.  No abnormal fluid collections identified.    Vasculature: Abdominal aorta is normal in caliber, contour, and course without significant calcific atherosclerosis.    Bones: Mild L5-S1 degenerative change.  Otherwise, osseous structures appear unremarkable without acute fracture or bone destructive process.                                 Medical Decision Making:   History:   Old Medical Records: I decided to obtain old medical records.  Old Records Summarized: records from another hospital.       <> Summary of Records: Had abdominal plasty at [facility] last week.  Clinical Tests:   Lab Tests: Ordered and Reviewed  Radiological Study: Ordered and Reviewed  ED Management:  Vitals normal except for tachycardia.  Afebrile.  Here with tachycardia.  Surgical wound appears well without evidence of localized infection or purulent drainage.  Attempted to reach out to her plastic surgeon, but he does not practice here as patient suggested.  CBC, BMP, UA, CTA PE protocol, CT abd pelvis obtained.    UA consistent with possible UTI.  Dose of keflex given here.  CT abdomen pelvis without obvious abscess or concerning abnormality.  CTA PE protocol without evidence of pulmonary embolism.  Labs otherwise grossly normal.    Will discharge home with prescription for Keflex.  Advised patient reach out to her plastic surgeon for follow-up.  Stable for discharge this time.  Return precautions discussed              Scribe Attestation:   Scribe #1: I performed the above scribed service and the documentation accurately describes the services I performed. I attest to the accuracy of the note.                          Clinical Impression:       ICD-10-CM ICD-9-CM   1. Urinary tract infection without hematuria, site unspecified N39.0 599.0   2. Post-operative state  Z98.890 V45.89         Disposition:   Disposition: Discharged  Condition: Stable                     Raphael Gilbert MD  12/28/19 133

## 2019-12-26 NOTE — ED NOTES
Patient identifiers verified and correct for Ms Canales  C/C: Elevated HR and blood pressure PTA SEE NN  APPEARANCE: awake and alert in NAD.  SKIN: warm, dry and intact. No breakdown or bruising.  MUSCULOSKELETAL: Patient moving all extremities spontaneously, no obvious swelling or deformities noted. Ambulates independently.  RESPIRATORY: Denies shortness of breath.Respirations unlabored. Denies fevers.   CARDIAC: Denies CP, 2+ distal pulses; no peripheral edema  ABDOMEN: S/ND/NT, Denies nausea  : voids spontaneously, denies difficulty, ANGELA drain to right flank, steristrips intact to mid abdomen  Neurologic: AAO x 4; follows commands equal strength in all extremities; denies numbness/tingling. Denies dizziness Denies weakness

## 2019-12-27 LAB
BACTERIA UR CULT: NORMAL
BACTERIA UR CULT: NORMAL

## 2020-01-24 ENCOUNTER — TELEPHONE (OUTPATIENT)
Dept: OBSTETRICS AND GYNECOLOGY | Facility: CLINIC | Age: 45
End: 2020-01-24

## 2020-01-24 NOTE — TELEPHONE ENCOUNTER
I left a message for the pt to call the office back at [phone] to see how she was doing after her tummy tuck and to see if she is still having issues with the pain for the endometriosis?

## 2020-01-27 ENCOUNTER — TELEPHONE (OUTPATIENT)
Dept: OBSTETRICS AND GYNECOLOGY | Facility: CLINIC | Age: 45
End: 2020-01-27

## 2020-01-27 NOTE — TELEPHONE ENCOUNTER
----- Message from Eugenia Garza sent at 1/27/2020  9:05 AM CST -----  Contact: BUSTER JARRELL [[id number]]  Name of Who is Calling : BUSTER JARRELL [[id number]]    Patient is returning call from jesus   .....Please contact to further discuss and advise.    Can the clinic reply by [facility] : No     What Number to Call Back :  [phone]

## 2020-01-27 NOTE — TELEPHONE ENCOUNTER
----- Message from Eugenia Garza sent at 1/27/2020  9:05 AM CST -----  Contact: BUSTER JARRELL [[id number]]  Name of Who is Calling : BUSTER JARRELL [[id number]]    Patient is returning call from jesus   .....Please contact to further discuss and advise.    Can the clinic reply by [facility] : No     What Number to Call Back :  [phone]

## 2020-01-27 NOTE — TELEPHONE ENCOUNTER
I spoke to the pt and she states that she is fine after her tummy tuck  And she had a ultrasound done and it showed fibroids. Pt was told by her doctor that her results were fine. Pt will call to schedule an appt to come in for her annual exam.

## 2021-02-02 ENCOUNTER — HOSPITAL ENCOUNTER (EMERGENCY)
Facility: HOSPITAL | Age: 46
Discharge: HOME | End: 2021-02-02
Attending: EMERGENCY MEDICINE
Payer: MEDICAID

## 2021-02-02 VITALS
WEIGHT: 135 LBS | BODY MASS INDEX: 26.5 KG/M2 | SYSTOLIC BLOOD PRESSURE: 149 MMHG | HEIGHT: 60 IN | HEART RATE: 102 BPM | OXYGEN SATURATION: 99 % | TEMPERATURE: 98 F | RESPIRATION RATE: 18 BRPM | DIASTOLIC BLOOD PRESSURE: 90 MMHG

## 2021-02-02 DIAGNOSIS — R21 RASH: Primary | ICD-10-CM

## 2021-02-02 PROCEDURE — 99283 EMERGENCY DEPT VISIT LOW MDM: CPT

## 2021-02-02 RX ORDER — MUPIROCIN 20 MG/G
OINTMENT TOPICAL 3 TIMES DAILY
Qty: 30 G | Refills: 0 | Status: SHIPPED | OUTPATIENT
Start: 2021-02-02

## 2021-05-04 ENCOUNTER — PATIENT MESSAGE (OUTPATIENT)
Dept: RESEARCH | Facility: HOSPITAL | Age: 46
End: 2021-05-04

## 2022-08-16 ENCOUNTER — TELEPHONE (OUTPATIENT)
Dept: OBSTETRICS AND GYNECOLOGY | Facility: CLINIC | Age: 47
End: 2022-08-16
Payer: MEDICAID

## 2022-08-16 NOTE — TELEPHONE ENCOUNTER
Pt was calling for an apt on a day that we were booked. I explained to the pt that the first apt is in November. Pt states that she will wait to closer that time to call for an apt.

## 2022-08-16 NOTE — TELEPHONE ENCOUNTER
----- Message from Max Galarza sent at 8/16/2022 11:56 AM CDT -----  Name of Who is Calling: BUSTER JARRELL [[id number]]            What is the request in detail: Patient is requesting call back a [facility] appointment 8/26 no available spots              Can the clinic reply by [facility]: no              What Number to Call Back if not in [facility]: [phone]

## 2022-10-19 ENCOUNTER — APPOINTMENT (OUTPATIENT)
Dept: URBAN - METROPOLITAN AREA CLINIC 207 | Age: 47
Setting detail: DERMATOLOGY
End: 2022-10-24

## 2022-10-19 DIAGNOSIS — L20.89 OTHER ATOPIC DERMATITIS: ICD-10-CM

## 2022-10-19 PROCEDURE — OTHER PRESCRIPTION: OTHER

## 2022-10-19 PROCEDURE — OTHER COUNSELING: OTHER

## 2022-10-19 PROCEDURE — OTHER ADDITIONAL NOTES: OTHER

## 2022-10-19 PROCEDURE — 99203 OFFICE O/P NEW LOW 30 MIN: CPT

## 2022-10-19 RX ORDER — PIMECROLIMUS 10 MG/G
CREAM TOPICAL
Qty: 30 | Refills: 0 | Status: ERX | COMMUNITY
Start: 2022-10-19

## 2022-10-19 ASSESSMENT — LOCATION SIMPLE DESCRIPTION DERM
LOCATION SIMPLE: RIGHT UPPER ARM
LOCATION SIMPLE: LEFT FOREARM
LOCATION SIMPLE: RIGHT ANTERIOR NECK
LOCATION SIMPLE: LEFT UPPER ARM

## 2022-10-19 ASSESSMENT — LOCATION ZONE DERM
LOCATION ZONE: NECK
LOCATION ZONE: ARM

## 2022-10-19 ASSESSMENT — LOCATION DETAILED DESCRIPTION DERM
LOCATION DETAILED: RIGHT ANTECUBITAL SKIN
LOCATION DETAILED: RIGHT INFERIOR LATERAL NECK
LOCATION DETAILED: RIGHT INFERIOR ANTERIOR NECK
LOCATION DETAILED: LEFT VENTRAL PROXIMAL FOREARM
LOCATION DETAILED: LEFT ANTECUBITAL SKIN

## 2022-10-19 NOTE — PROCEDURE: ADDITIONAL NOTES
Render Risk Assessment In Note?: no
Detail Level: Simple
Additional Notes: Recommended Tide free or All free and clear laundry detergents, Luke warm showers and moisturize immediately afterwards. Avoid scratching affected areas

## 2022-10-19 NOTE — HPI: RASH
Is This A New Presentation, Or A Follow-Up?: Rash
Additional History: Patient uses Dove cleanser and Cetaphil cream moisturizer